# Patient Record
Sex: MALE | Race: OTHER | NOT HISPANIC OR LATINO | URBAN - METROPOLITAN AREA
[De-identification: names, ages, dates, MRNs, and addresses within clinical notes are randomized per-mention and may not be internally consistent; named-entity substitution may affect disease eponyms.]

---

## 2024-11-01 ENCOUNTER — EMERGENCY (EMERGENCY)
Facility: HOSPITAL | Age: 24
LOS: 1 days | Discharge: HOME | End: 2024-11-01
Attending: EMERGENCY MEDICINE
Payer: SELF-PAY

## 2024-11-01 VITALS
SYSTOLIC BLOOD PRESSURE: 144 MMHG | TEMPERATURE: 98 F | HEIGHT: 68 IN | OXYGEN SATURATION: 99 % | WEIGHT: 149.91 LBS | DIASTOLIC BLOOD PRESSURE: 82 MMHG | HEART RATE: 74 BPM | RESPIRATION RATE: 16 BRPM

## 2024-11-01 PROCEDURE — 73030 X-RAY EXAM OF SHOULDER: CPT

## 2024-11-01 PROCEDURE — 90715 TDAP VACCINE 7 YRS/> IM: CPT

## 2024-11-01 PROCEDURE — 73610 X-RAY EXAM OF ANKLE: CPT | Mod: 26,RT

## 2024-11-01 PROCEDURE — 73630 X-RAY EXAM OF FOOT: CPT | Mod: 26,RT

## 2024-11-01 PROCEDURE — 73610 X-RAY EXAM OF ANKLE: CPT

## 2024-11-01 PROCEDURE — 99284 EMERGENCY DEPT VISIT MOD MDM: CPT

## 2024-11-01 PROCEDURE — 73030 X-RAY EXAM OF SHOULDER: CPT | Mod: 26,RT

## 2024-11-01 PROCEDURE — 99284 EMERGENCY DEPT VISIT MOD MDM: CPT | Mod: 25

## 2024-11-01 PROCEDURE — 73630 X-RAY EXAM OF FOOT: CPT

## 2024-11-01 PROCEDURE — 90471 IMMUNIZATION ADMIN: CPT

## 2024-11-01 RX ORDER — CLOSTRIDIUM TETANI TOXOID ANTIGEN (FORMALDEHYDE INACTIVATED), CORYNEBACTERIUM DIPHTHERIAE TOXOID ANTIGEN (FORMALDEHYDE INACTIVATED), BORDETELLA PERTUSSIS TOXOID ANTIGEN (GLUTARALDEHYDE INACTIVATED), BORDETELLA PERTUSSIS FILAMENTOUS HEMAGGLUTININ ANTIGEN (FORMALDEHYDE INACTIVATED), BORDETELLA PERTUSSIS PERTACTIN ANTIGEN, AND BORDETELLA PERTUSSIS FIMBRIAE 2/3 ANTIGEN 5; 2; 2.5; 5; 3; 5 [LF]/.5ML; [LF]/.5ML; UG/.5ML; UG/.5ML; UG/.5ML; UG/.5ML
0.5 INJECTION, SUSPENSION INTRAMUSCULAR ONCE
Refills: 0 | Status: COMPLETED | OUTPATIENT
Start: 2024-11-01 | End: 2024-11-01

## 2024-11-01 RX ORDER — IBUPROFEN 200 MG
1 TABLET ORAL
Qty: 28 | Refills: 0
Start: 2024-11-01 | End: 2024-11-07

## 2024-11-01 RX ORDER — LIDOCAINE HYDROCHLORIDE 40 MG/ML
1 SOLUTION TOPICAL
Qty: 1 | Refills: 0
Start: 2024-11-01 | End: 2024-11-05

## 2024-11-01 RX ORDER — IBUPROFEN 200 MG
600 TABLET ORAL ONCE
Refills: 0 | Status: COMPLETED | OUTPATIENT
Start: 2024-11-01 | End: 2024-11-01

## 2024-11-01 RX ORDER — METHOCARBAMOL 500 MG/1
2 TABLET ORAL
Qty: 18 | Refills: 0
Start: 2024-11-01 | End: 2024-11-03

## 2024-11-01 RX ADMIN — CLOSTRIDIUM TETANI TOXOID ANTIGEN (FORMALDEHYDE INACTIVATED), CORYNEBACTERIUM DIPHTHERIAE TOXOID ANTIGEN (FORMALDEHYDE INACTIVATED), BORDETELLA PERTUSSIS TOXOID ANTIGEN (GLUTARALDEHYDE INACTIVATED), BORDETELLA PERTUSSIS FILAMENTOUS HEMAGGLUTININ ANTIGEN (FORMALDEHYDE INACTIVATED), BORDETELLA PERTUSSIS PERTACTIN ANTIGEN, AND BORDETELLA PERTUSSIS FIMBRIAE 2/3 ANTIGEN 0.5 MILLILITER(S): 5; 2; 2.5; 5; 3; 5 INJECTION, SUSPENSION INTRAMUSCULAR at 13:47

## 2024-11-01 RX ADMIN — Medication 600 MILLIGRAM(S): at 13:47

## 2024-11-01 RX ADMIN — Medication 600 MILLIGRAM(S): at 14:17

## 2024-11-01 NOTE — ED ADULT NURSE NOTE - NSFALLRISKINTERV_ED_ALL_ED
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
## 2024-11-01 NOTE — ED PROVIDER NOTE - NSFOLLOWUPINSTRUCTIONS_ED_ALL_ED_FT
Follow up with your primary care doctor within 1 week.     Follow up with orthopedics if pain does not improve in 2 weeks.     You can take Motrin (ibuprofen) 600 mg every 6 hours or Tylenol (acetaminophen) 1000 mg every 6 hours as needed for pain or fever.     Apply ice to the area 10-15 minutes 3x a day     Perform the range of motion exercises below. If you do not move your arm, you risk having a frozen shoulder.    Range of motion exercises:  - Pendulum swings - patient bends slightly at waist with arm hanging freely in front of the body. Arm should be swing in gentle arcs of motion both clockwise and counter-clockwise. Swing to level of pain tolerance 5-10 minutes 3 times a day.   - Walk fingers up wall - stand sideways an arm's length from wall and walk fingers up wall to level of pain tolerating 4x a day.     If you experience any new or worsening symptoms or if you are concerned you can always come back to the emergency for a re-evaluation.
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
## 2024-11-01 NOTE — ED PROVIDER NOTE - PATIENT PORTAL LINK FT
You can access the FollowMyHealth Patient Portal offered by [facility] by registering at the following website: [email or web address]. By joining [employer]’s FollowMyHealth portal, you will also be able to view your health information using other applications (apps) compatible with our system.

## 2024-11-01 NOTE — ED PROVIDER NOTE - MDM ORDERS SUBMITTED SELECTION
Spoke with daughter and its [facility] [address] [facility] [address]    Already added to chart        Imaging Studies/Medications
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
## 2024-11-01 NOTE — ED PROVIDER NOTE - PROGRESS NOTE DETAILS
X-rays are without fracture.  Will DC patient home with outpatient orthopedic follow-up and pain medication. Pt is well appearing walking with steady gait, stable for discharge and follow up without fail with medical doctor. I had a detailed discussion with the patient and/or guardian regarding the historical points, exam findings, and any diagnostic results supporting the discharge diagnosis. Pt educated on care and need for follow up. Strict return instructions and red flag signs and symptoms discussed with patient. Questions answered. Pt shows understanding of discharge information and agrees to follow.

## 2024-11-01 NOTE — ED PROVIDER NOTE - PHYSICAL EXAMINATION
Right shoulder - Decreased ROM, unable to raise arm above shoulder height. +TTP of ACM and trapezius muscle. +radial pulse.     Right foot/ankle - No swelling, discoloration, erythema, ecchymosis, deformity, lacerations, abrasions, ulcers or sensory deficits. Popliteal, dorsalis pedis and posterior tibial pulses equally strong 4+ bilaterally. Capillary refill in lower extremity < 2 seconds. Full range of motion during dorsiflexion, plantar flexion, eversion and inversion. Extensor hallucis longus flexion and extension intact. DP and PT pulses 2+ bilaterally. +TTP of MTP joint of right 1st toe    abrasions to b/l elbows. Full ROM.    Head is normocephalic and atraumatic. No head tenderness or skull depression on palpation. No temporal cord-like sensation, increased warmth or tenderness. Pt is alert and oriented x 3, able to follow commands. No facial asymmetry. Pupils 5 mm equally round with PERRL, no nystagmus, EOM intact. Cranial nerves III-XII grossly intact. Coordination nose-to-finger intact. All limbs equally strong bilaterally 5/5. No pronator drift. No numbness or tingling sensation.  No spinal/paraspinal tenderness. Neck is non-tender, supple with full range of motion. No nuchal rigidity.

## 2024-11-01 NOTE — ED PROVIDER NOTE - ATTENDING APP SHARED VISIT CONTRIBUTION OF CARE
I have seen the patient with the KALI and agree with above examination and assessment and plan with the following addendum:        Focused PE:   General: NAD, alert and oriented  Head: Normocephalic, atraumatic  Eyes: PERRLA, EOMI  Cardiac: RRR, no murmurs, rubs or gallops  Resp: CTA, no wheezes, rales or ronchi  GI: Nondistended, nontender, no rebound or guarding  MSK: R. trapezius soreness  Neuro: Alert and oriented, no focal deficits.   Ext: Non edematous, nontender.

## 2025-01-25 NOTE — ED PROVIDER NOTE - NSTIMEPROVIDERCAREINITIATE_GEN_ER
Patient has hypotension 91/48 and 80/59.  Patient was not on fluids overnight. Day shift nurse started normal saline at 500 mL/hr bolus (post op PRN order already in place).  Holding metoprolol for now, will reevaluate after bolus. On call doctor verified via perfect serve.   01-Nov-2024 12:32

## 2025-06-06 ENCOUNTER — EMERGENCY (EMERGENCY)
Facility: HOSPITAL | Age: 25
LOS: 1 days | End: 2025-06-06
Attending: STUDENT IN AN ORGANIZED HEALTH CARE EDUCATION/TRAINING PROGRAM
Payer: MEDICAID

## 2025-06-06 VITALS
TEMPERATURE: 98 F | HEART RATE: 83 BPM | DIASTOLIC BLOOD PRESSURE: 82 MMHG | OXYGEN SATURATION: 97 % | SYSTOLIC BLOOD PRESSURE: 121 MMHG | RESPIRATION RATE: 18 BRPM | HEIGHT: 68 IN | WEIGHT: 198.86 LBS

## 2025-06-06 PROCEDURE — 99283 EMERGENCY DEPT VISIT LOW MDM: CPT | Mod: 25

## 2025-06-06 PROCEDURE — 10060 I&D ABSCESS SIMPLE/SINGLE: CPT

## 2025-06-06 RX ORDER — DOXYCYCLINE HYCLATE 100 MG
1 TABLET ORAL
Qty: 14 | Refills: 0
Start: 2025-06-06 | End: 2025-06-12

## 2025-06-06 NOTE — ED ADULT TRIAGE NOTE - CHIEF COMPLAINT QUOTE
"5 days ago I thought it was an ingrowned hair in the middle of my butt but its like a cyst very painful with some drainage" Denies any PMH, fever, vomiting

## 2025-06-06 NOTE — ED PROVIDER NOTE - ATTENDING APP SHARED VISIT CONTRIBUTION OF CARE
Sharyn Alba (Rodriguez), DO   I have personally performed a face to face medical and diagnostic evaluation of the patient. I have discussed with and reviewed the KALI's note and agree with the History, ROS, Physical Exam and MDM and made edits as needed to reflect my history, physical, clinical interpretation and plan. I actively participated in the comanagement of this patient with the KALI. I have personally reviewed all orders, study/imaging results, medication orders. I discussed indications for consultant evaluation and consultant recommendations with the KALI when applicable, and have discussed the disposition plan with the KALI.

## 2025-06-06 NOTE — ED PROVIDER NOTE - NSFOLLOWUPINSTRUCTIONS_ED_ALL_ED_FT
Follow up with your primary care doctor within 1 week.     You have been given a prescription for the antibiotic Doxycycline. This medication may make your skin MORE SENSITIVE TO GET SUNBURN. Common side effects may be: nausea, vomiting, abdominal pain. Take the medication with food and full glass of water to prevent irritation of you esophagus. Taking the medication with food containing iron or calcium may decrease its absorption.     Take the antibiotics as prescribed and until all of the medication is completed, even if you are feeling better.     Perform sitz baths, sitting in a warm bath tub 3x a day for 20 minutes to help facilitate drainage.     You can take Motrin (ibuprofen) 600 mg every 6 hours or Tylenol (acetaminophen) 1000 mg every 6 hours as needed for pain or fever.     If you experience any new or worsening symptoms or if you are concerned you can always come back to the emergency for a re-evaluation.

## 2025-06-06 NOTE — ED PROVIDER NOTE - CLINICAL SUMMARY MEDICAL DECISION MAKING FREE TEXT BOX
25-year-old male with no past medical history presents with 5 days of a painful abscess to his left buttock with yellow/bloody discharge.  Denies any fevers.  Has not taken any medications to his symptoms.    Abscess noted to left buttock. Will I&D and dc home with antibiotics.

## 2025-06-06 NOTE — ED PROVIDER NOTE - OBJECTIVE STATEMENT
25-year-old male with no past medical history presents with 5 days of a painful abscess to his left buttock with yellow/bloody discharge.  Denies any fevers.  Has not taken any medications to his symptoms.

## 2025-06-06 NOTE — ED PROVIDER NOTE - PATIENT PORTAL LINK FT
You can access the FollowMyHealth Patient Portal offered by [facility] by registering at the following website: [email or web address]. By joining [employer]’s FollowMyHealth portal, you will also be able to view your health information using other applications (apps) compatible with our system.
